# Patient Record
Sex: FEMALE | Race: WHITE | NOT HISPANIC OR LATINO | Employment: OTHER | URBAN - METROPOLITAN AREA
[De-identification: names, ages, dates, MRNs, and addresses within clinical notes are randomized per-mention and may not be internally consistent; named-entity substitution may affect disease eponyms.]

---

## 2023-12-04 ENCOUNTER — TELEPHONE (OUTPATIENT)
Dept: NEUROLOGY | Facility: CLINIC | Age: 78
End: 2023-12-04
Payer: MEDICARE

## 2023-12-04 NOTE — TELEPHONE ENCOUNTER
----- Message from Elysia Vazquez sent at 12/4/2023 12:13 PM CST -----  Type:  Sooner Apoointment Request    Caller is requesting a sooner appointment.  Caller declined first available appointment listed below.  Caller will not accept being placed on the waitlist and is requesting a message be sent to doctor.  Name of Caller: Pt  When is the first available appointment?  Symptoms: Vertigo issues  Would the patient rather a call back or a response via [facility]? Call  Best Call Back Number: [phone]  Additional Information:

## 2023-12-04 NOTE — TELEPHONE ENCOUNTER
----- Message from Elysia Vazquez sent at 12/4/2023 12:13 PM CST -----  Type:  Sooner Apoointment Request    Caller is requesting a sooner appointment.  Caller declined first available appointment listed below.  Caller will not accept being placed on the waitlist and is requesting a message be sent to doctor.  Name of Caller: Pt  When is the first available appointment?  Symptoms: Vertigo issues  Would the patient rather a call back or a response via [facility]? Call  Best Call Back Number: [phone]  Additional Information:

## 2023-12-04 NOTE — TELEPHONE ENCOUNTER
----- Message from Sai Gamino MD sent at 12/4/2023 12:41 PM CST -----    Could you find this patient a spot as soon as possible? Thank you

## 2023-12-04 NOTE — TELEPHONE ENCOUNTER
Spoke with patient and offered appt w/ Dr. Maldonado in Feb, patient stated she would like appt with Dr. Gamino, I offered N/A appt of 05/30/2024 patient declined appointment

## 2023-12-12 ENCOUNTER — OFFICE VISIT (OUTPATIENT)
Dept: NEUROLOGY | Facility: CLINIC | Age: 78
End: 2023-12-12
Payer: MEDICARE

## 2023-12-12 ENCOUNTER — LAB VISIT (OUTPATIENT)
Dept: LAB | Facility: HOSPITAL | Age: 78
End: 2023-12-12
Attending: PSYCHIATRY & NEUROLOGY
Payer: MEDICARE

## 2023-12-12 VITALS
WEIGHT: 140.63 LBS | BODY MASS INDEX: 26.55 KG/M2 | SYSTOLIC BLOOD PRESSURE: 148 MMHG | HEART RATE: 97 BPM | HEIGHT: 61 IN | DIASTOLIC BLOOD PRESSURE: 67 MMHG

## 2023-12-12 DIAGNOSIS — E03.9 ACQUIRED HYPOTHYROIDISM: ICD-10-CM

## 2023-12-12 DIAGNOSIS — E78.1 HYPERTRIGLYCERIDEMIA: ICD-10-CM

## 2023-12-12 DIAGNOSIS — R80.9 MICROALBUMINURIA DUE TO TYPE 2 DIABETES MELLITUS: ICD-10-CM

## 2023-12-12 DIAGNOSIS — N25.81 SECONDARY HYPERPARATHYROIDISM OF RENAL ORIGIN: ICD-10-CM

## 2023-12-12 DIAGNOSIS — Z79.4 CONTROLLED TYPE 2 DIABETES MELLITUS WITH STAGE 3 CHRONIC KIDNEY DISEASE, WITH LONG-TERM CURRENT USE OF INSULIN: ICD-10-CM

## 2023-12-12 DIAGNOSIS — N18.30 CONTROLLED TYPE 2 DIABETES MELLITUS WITH STAGE 3 CHRONIC KIDNEY DISEASE, WITH LONG-TERM CURRENT USE OF INSULIN: ICD-10-CM

## 2023-12-12 DIAGNOSIS — R42 DIZZINESS AND GIDDINESS: ICD-10-CM

## 2023-12-12 DIAGNOSIS — E61.1 IRON DEFICIENCY: ICD-10-CM

## 2023-12-12 DIAGNOSIS — M85.851 OSTEOPENIA OF NECKS OF BOTH FEMURS: ICD-10-CM

## 2023-12-12 DIAGNOSIS — E78.5 HYPERLIPIDEMIA ASSOCIATED WITH TYPE 2 DIABETES MELLITUS: ICD-10-CM

## 2023-12-12 DIAGNOSIS — R42 VERTIGO: Primary | ICD-10-CM

## 2023-12-12 DIAGNOSIS — G89.29 CHRONIC LEFT-SIDED LOW BACK PAIN WITH LEFT-SIDED SCIATICA: ICD-10-CM

## 2023-12-12 DIAGNOSIS — Z87.820 PERSONAL HISTORY OF TRAUMATIC BRAIN INJURY: ICD-10-CM

## 2023-12-12 DIAGNOSIS — R25.2 MUSCLE CRAMPS: ICD-10-CM

## 2023-12-12 DIAGNOSIS — E11.59 HYPERTENSION COMPLICATING DIABETES: ICD-10-CM

## 2023-12-12 DIAGNOSIS — E11.69 HYPERLIPIDEMIA ASSOCIATED WITH TYPE 2 DIABETES MELLITUS: ICD-10-CM

## 2023-12-12 DIAGNOSIS — E11.29 MICROALBUMINURIA DUE TO TYPE 2 DIABETES MELLITUS: ICD-10-CM

## 2023-12-12 DIAGNOSIS — E11.22 CKD STAGE 3 DUE TO TYPE 2 DIABETES MELLITUS: ICD-10-CM

## 2023-12-12 DIAGNOSIS — G47.09 OTHER INSOMNIA: ICD-10-CM

## 2023-12-12 DIAGNOSIS — E55.9 VITAMIN D DEFICIENCY: ICD-10-CM

## 2023-12-12 DIAGNOSIS — I15.2 HYPERTENSION COMPLICATING DIABETES: ICD-10-CM

## 2023-12-12 DIAGNOSIS — R11.15 CYCLICAL VOMITING: ICD-10-CM

## 2023-12-12 DIAGNOSIS — H91.92 HEARING LOSS OF LEFT EAR, UNSPECIFIED HEARING LOSS TYPE: ICD-10-CM

## 2023-12-12 DIAGNOSIS — R42 VERTIGO: ICD-10-CM

## 2023-12-12 DIAGNOSIS — M85.852 OSTEOPENIA OF NECKS OF BOTH FEMURS: ICD-10-CM

## 2023-12-12 DIAGNOSIS — H91.92 HEARING LOSS ASSOCIATED WITH SYNDROME OF LEFT EAR: ICD-10-CM

## 2023-12-12 DIAGNOSIS — M54.42 CHRONIC LEFT-SIDED LOW BACK PAIN WITH LEFT-SIDED SCIATICA: ICD-10-CM

## 2023-12-12 DIAGNOSIS — N18.30 CKD STAGE 3 DUE TO TYPE 2 DIABETES MELLITUS: ICD-10-CM

## 2023-12-12 DIAGNOSIS — E11.22 CONTROLLED TYPE 2 DIABETES MELLITUS WITH STAGE 3 CHRONIC KIDNEY DISEASE, WITH LONG-TERM CURRENT USE OF INSULIN: ICD-10-CM

## 2023-12-12 LAB — CK SERPL-CCNC: 220 U/L (ref 20–180)

## 2023-12-12 PROCEDURE — 1126F PR PAIN SEVERITY QUANTIFIED, NO PAIN PRESENT: ICD-10-PCS | Mod: CPTII,S$GLB,, | Performed by: PSYCHIATRY & NEUROLOGY

## 2023-12-12 PROCEDURE — 1101F PT FALLS ASSESS-DOCD LE1/YR: CPT | Mod: CPTII,S$GLB,, | Performed by: PSYCHIATRY & NEUROLOGY

## 2023-12-12 PROCEDURE — 99417 PROLNG OP E/M EACH 15 MIN: CPT | Mod: S$GLB,,, | Performed by: PSYCHIATRY & NEUROLOGY

## 2023-12-12 PROCEDURE — 1101F PR PT FALLS ASSESS DOC 0-1 FALLS W/OUT INJ PAST YR: ICD-10-PCS | Mod: CPTII,S$GLB,, | Performed by: PSYCHIATRY & NEUROLOGY

## 2023-12-12 PROCEDURE — 3288F PR FALLS RISK ASSESSMENT DOCUMENTED: ICD-10-PCS | Mod: CPTII,S$GLB,, | Performed by: PSYCHIATRY & NEUROLOGY

## 2023-12-12 PROCEDURE — 1159F PR MEDICATION LIST DOCUMENTED IN MEDICAL RECORD: ICD-10-PCS | Mod: CPTII,S$GLB,, | Performed by: PSYCHIATRY & NEUROLOGY

## 2023-12-12 PROCEDURE — 99999 PR PBB SHADOW E&M-EST. PATIENT-LVL V: CPT | Mod: PBBFAC,,, | Performed by: PSYCHIATRY & NEUROLOGY

## 2023-12-12 PROCEDURE — 36415 COLL VENOUS BLD VENIPUNCTURE: CPT | Performed by: PSYCHIATRY & NEUROLOGY

## 2023-12-12 PROCEDURE — 3077F PR MOST RECENT SYSTOLIC BLOOD PRESSURE >= 140 MM HG: ICD-10-PCS | Mod: CPTII,S$GLB,, | Performed by: PSYCHIATRY & NEUROLOGY

## 2023-12-12 PROCEDURE — 99417 PR PROLONGED SVC, OUTPT, W/WO DIRECT PT CONTACT,  EA ADDTL 15 MIN: ICD-10-PCS | Mod: S$GLB,,, | Performed by: PSYCHIATRY & NEUROLOGY

## 2023-12-12 PROCEDURE — 3288F FALL RISK ASSESSMENT DOCD: CPT | Mod: CPTII,S$GLB,, | Performed by: PSYCHIATRY & NEUROLOGY

## 2023-12-12 PROCEDURE — 1126F AMNT PAIN NOTED NONE PRSNT: CPT | Mod: CPTII,S$GLB,, | Performed by: PSYCHIATRY & NEUROLOGY

## 2023-12-12 PROCEDURE — 82550 ASSAY OF CK (CPK): CPT | Performed by: PSYCHIATRY & NEUROLOGY

## 2023-12-12 PROCEDURE — 3078F DIAST BP <80 MM HG: CPT | Mod: CPTII,S$GLB,, | Performed by: PSYCHIATRY & NEUROLOGY

## 2023-12-12 PROCEDURE — 99205 OFFICE O/P NEW HI 60 MIN: CPT | Mod: S$GLB,,, | Performed by: PSYCHIATRY & NEUROLOGY

## 2023-12-12 PROCEDURE — 99205 PR OFFICE/OUTPT VISIT, NEW, LEVL V, 60-74 MIN: ICD-10-PCS | Mod: S$GLB,,, | Performed by: PSYCHIATRY & NEUROLOGY

## 2023-12-12 PROCEDURE — 82085 ASSAY OF ALDOLASE: CPT | Performed by: PSYCHIATRY & NEUROLOGY

## 2023-12-12 PROCEDURE — 1159F MED LIST DOCD IN RCRD: CPT | Mod: CPTII,S$GLB,, | Performed by: PSYCHIATRY & NEUROLOGY

## 2023-12-12 PROCEDURE — 3078F PR MOST RECENT DIASTOLIC BLOOD PRESSURE < 80 MM HG: ICD-10-PCS | Mod: CPTII,S$GLB,, | Performed by: PSYCHIATRY & NEUROLOGY

## 2023-12-12 PROCEDURE — 99999 PR PBB SHADOW E&M-EST. PATIENT-LVL V: ICD-10-PCS | Mod: PBBFAC,,, | Performed by: PSYCHIATRY & NEUROLOGY

## 2023-12-12 PROCEDURE — 3077F SYST BP >= 140 MM HG: CPT | Mod: CPTII,S$GLB,, | Performed by: PSYCHIATRY & NEUROLOGY

## 2023-12-12 RX ORDER — ICOSAPENT ETHYL 1000 MG/1
CAPSULE ORAL
COMMUNITY
Start: 2023-08-11

## 2023-12-12 RX ORDER — LOSARTAN POTASSIUM 100 MG/1
100 TABLET ORAL DAILY
COMMUNITY

## 2023-12-12 RX ORDER — DAPAGLIFLOZIN 10 MG/1
10 TABLET, FILM COATED ORAL EVERY MORNING
COMMUNITY
Start: 2023-10-20

## 2023-12-12 RX ORDER — LANCETS 33 GAUGE
100 EACH MISCELLANEOUS
COMMUNITY
Start: 2023-05-25

## 2023-12-12 RX ORDER — ERGOCALCIFEROL 1.25 MG/1
50000 CAPSULE ORAL
COMMUNITY
Start: 2023-11-20

## 2023-12-12 RX ORDER — DEXTROSE 4 G
TABLET,CHEWABLE ORAL
COMMUNITY
Start: 2023-05-25

## 2023-12-12 RX ORDER — LEVOTHYROXINE SODIUM 50 UG/1
50 TABLET ORAL
COMMUNITY

## 2023-12-12 RX ORDER — INSULIN GLARGINE AND LIXISENATIDE 100; 33 U/ML; UG/ML
100 INJECTION, SOLUTION SUBCUTANEOUS NIGHTLY
COMMUNITY

## 2023-12-12 RX ORDER — AMITRIPTYLINE HYDROCHLORIDE 25 MG/1
25 TABLET, FILM COATED ORAL NIGHTLY PRN
Qty: 90 TABLET | Refills: 3 | Status: SHIPPED | OUTPATIENT
Start: 2023-12-12 | End: 2024-02-07 | Stop reason: SDUPTHER

## 2023-12-12 RX ORDER — CALCIUM CITRATE/VITAMIN D3 200MG-6.25
TABLET ORAL
COMMUNITY
Start: 2023-05-25

## 2023-12-12 RX ORDER — ONDANSETRON 4 MG/1
4 TABLET, ORALLY DISINTEGRATING ORAL EVERY 6 HOURS PRN
COMMUNITY
Start: 2023-12-02 | End: 2024-02-07 | Stop reason: SDUPTHER

## 2023-12-12 RX ORDER — COLESEVELAM 180 1/1
625 TABLET ORAL EVERY MORNING
COMMUNITY
Start: 2023-08-30

## 2023-12-12 RX ORDER — ATORVASTATIN CALCIUM 40 MG/1
40 TABLET, FILM COATED ORAL DAILY
COMMUNITY

## 2023-12-12 RX ORDER — LANCING DEVICE
EACH MISCELLANEOUS
COMMUNITY
Start: 2023-05-25

## 2023-12-15 LAB — ALDOLASE SERPL-CCNC: 7.7 U/L (ref 1.2–7.6)

## 2023-12-15 NOTE — PROGRESS NOTES
12-    CK and Aldolase are very mildly elevated which could be related to to the statin. Hopefully the Co-Q 10 will help in addition to increasing hydration.

## 2023-12-19 ENCOUNTER — OFFICE VISIT (OUTPATIENT)
Dept: OTOLARYNGOLOGY | Facility: CLINIC | Age: 78
End: 2023-12-19
Payer: MEDICARE

## 2023-12-19 DIAGNOSIS — R42 VERTIGO: ICD-10-CM

## 2023-12-19 PROCEDURE — 1159F PR MEDICATION LIST DOCUMENTED IN MEDICAL RECORD: ICD-10-PCS | Mod: CPTII,S$GLB,, | Performed by: PHYSICIAN ASSISTANT

## 2023-12-19 PROCEDURE — 3288F PR FALLS RISK ASSESSMENT DOCUMENTED: ICD-10-PCS | Mod: CPTII,S$GLB,, | Performed by: PHYSICIAN ASSISTANT

## 2023-12-19 PROCEDURE — 1101F PR PT FALLS ASSESS DOC 0-1 FALLS W/OUT INJ PAST YR: ICD-10-PCS | Mod: CPTII,S$GLB,, | Performed by: PHYSICIAN ASSISTANT

## 2023-12-19 PROCEDURE — 99999 PR PBB SHADOW E&M-EST. PATIENT-LVL III: ICD-10-PCS | Mod: PBBFAC,,, | Performed by: PHYSICIAN ASSISTANT

## 2023-12-19 PROCEDURE — 1159F MED LIST DOCD IN RCRD: CPT | Mod: CPTII,S$GLB,, | Performed by: PHYSICIAN ASSISTANT

## 2023-12-19 PROCEDURE — 3288F FALL RISK ASSESSMENT DOCD: CPT | Mod: CPTII,S$GLB,, | Performed by: PHYSICIAN ASSISTANT

## 2023-12-19 PROCEDURE — 99204 PR OFFICE/OUTPT VISIT, NEW, LEVL IV, 45-59 MIN: ICD-10-PCS | Mod: S$GLB,,, | Performed by: PHYSICIAN ASSISTANT

## 2023-12-19 PROCEDURE — 1101F PT FALLS ASSESS-DOCD LE1/YR: CPT | Mod: CPTII,S$GLB,, | Performed by: PHYSICIAN ASSISTANT

## 2023-12-19 PROCEDURE — 99999 PR PBB SHADOW E&M-EST. PATIENT-LVL III: CPT | Mod: PBBFAC,,, | Performed by: PHYSICIAN ASSISTANT

## 2023-12-19 PROCEDURE — 99204 OFFICE O/P NEW MOD 45 MIN: CPT | Mod: S$GLB,,, | Performed by: PHYSICIAN ASSISTANT
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
## 2023-12-28 ENCOUNTER — CLINICAL SUPPORT (OUTPATIENT)
Dept: REHABILITATION | Facility: HOSPITAL | Age: 78
End: 2023-12-28
Attending: PSYCHIATRY & NEUROLOGY
Payer: MEDICARE

## 2023-12-28 DIAGNOSIS — R42 VERTIGO: Primary | ICD-10-CM

## 2023-12-28 PROCEDURE — 97110 THERAPEUTIC EXERCISES: CPT | Performed by: PHYSICAL THERAPIST

## 2023-12-28 PROCEDURE — 97162 PT EVAL MOD COMPLEX 30 MIN: CPT | Performed by: PHYSICAL THERAPIST
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
## 2024-01-08 ENCOUNTER — HOSPITAL ENCOUNTER (OUTPATIENT)
Dept: PULMONOLOGY | Facility: HOSPITAL | Age: 79
Discharge: HOME | End: 2024-01-08
Attending: PSYCHIATRY & NEUROLOGY
Payer: MEDICARE

## 2024-01-08 ENCOUNTER — TELEPHONE (OUTPATIENT)
Dept: NEUROLOGY | Facility: CLINIC | Age: 79
End: 2024-01-08

## 2024-01-08 DIAGNOSIS — G89.29 CHRONIC LEFT-SIDED LOW BACK PAIN WITH LEFT-SIDED SCIATICA: ICD-10-CM

## 2024-01-08 DIAGNOSIS — E61.1 IRON DEFICIENCY: ICD-10-CM

## 2024-01-08 DIAGNOSIS — E78.1 HYPERTRIGLYCERIDEMIA: ICD-10-CM

## 2024-01-08 DIAGNOSIS — N18.30 CONTROLLED TYPE 2 DIABETES MELLITUS WITH STAGE 3 CHRONIC KIDNEY DISEASE, WITH LONG-TERM CURRENT USE OF INSULIN: ICD-10-CM

## 2024-01-08 DIAGNOSIS — M85.852 OSTEOPENIA OF NECKS OF BOTH FEMURS: ICD-10-CM

## 2024-01-08 DIAGNOSIS — E55.9 VITAMIN D DEFICIENCY: ICD-10-CM

## 2024-01-08 DIAGNOSIS — R42 DIZZINESS AND GIDDINESS: ICD-10-CM

## 2024-01-08 DIAGNOSIS — E11.29 MICROALBUMINURIA DUE TO TYPE 2 DIABETES MELLITUS: ICD-10-CM

## 2024-01-08 DIAGNOSIS — Z87.820 PERSONAL HISTORY OF TRAUMATIC BRAIN INJURY: ICD-10-CM

## 2024-01-08 DIAGNOSIS — R25.2 MUSCLE CRAMPS: ICD-10-CM

## 2024-01-08 DIAGNOSIS — R42 VERTIGO: ICD-10-CM

## 2024-01-08 DIAGNOSIS — N18.30 CKD STAGE 3 DUE TO TYPE 2 DIABETES MELLITUS: ICD-10-CM

## 2024-01-08 DIAGNOSIS — I15.2 HYPERTENSION COMPLICATING DIABETES: ICD-10-CM

## 2024-01-08 DIAGNOSIS — E03.9 ACQUIRED HYPOTHYROIDISM: ICD-10-CM

## 2024-01-08 DIAGNOSIS — H91.92 HEARING LOSS ASSOCIATED WITH SYNDROME OF LEFT EAR: ICD-10-CM

## 2024-01-08 DIAGNOSIS — E11.59 HYPERTENSION COMPLICATING DIABETES: ICD-10-CM

## 2024-01-08 DIAGNOSIS — N25.81 SECONDARY HYPERPARATHYROIDISM OF RENAL ORIGIN: ICD-10-CM

## 2024-01-08 DIAGNOSIS — E11.22 CONTROLLED TYPE 2 DIABETES MELLITUS WITH STAGE 3 CHRONIC KIDNEY DISEASE, WITH LONG-TERM CURRENT USE OF INSULIN: ICD-10-CM

## 2024-01-08 DIAGNOSIS — E11.22 CKD STAGE 3 DUE TO TYPE 2 DIABETES MELLITUS: ICD-10-CM

## 2024-01-08 DIAGNOSIS — R80.9 MICROALBUMINURIA DUE TO TYPE 2 DIABETES MELLITUS: ICD-10-CM

## 2024-01-08 DIAGNOSIS — E11.69 HYPERLIPIDEMIA ASSOCIATED WITH TYPE 2 DIABETES MELLITUS: ICD-10-CM

## 2024-01-08 DIAGNOSIS — E78.5 HYPERLIPIDEMIA ASSOCIATED WITH TYPE 2 DIABETES MELLITUS: ICD-10-CM

## 2024-01-08 DIAGNOSIS — M85.851 OSTEOPENIA OF NECKS OF BOTH FEMURS: ICD-10-CM

## 2024-01-08 DIAGNOSIS — Z79.4 CONTROLLED TYPE 2 DIABETES MELLITUS WITH STAGE 3 CHRONIC KIDNEY DISEASE, WITH LONG-TERM CURRENT USE OF INSULIN: ICD-10-CM

## 2024-01-08 DIAGNOSIS — M54.42 CHRONIC LEFT-SIDED LOW BACK PAIN WITH LEFT-SIDED SCIATICA: ICD-10-CM

## 2024-01-08 PROCEDURE — 95816 EEG AWAKE AND DROWSY: CPT

## 2024-01-08 PROCEDURE — 95819 EEG AWAKE AND ASLEEP: CPT | Mod: 26,,, | Performed by: PSYCHIATRY & NEUROLOGY

## 2024-01-08 NOTE — PROCEDURES
DATE EEG PERFORMED:  2024.      DATE EEG INTERPRETED: 2024.                     DURATION OF EE MINUTES         LEVEL OF CONSCIOUSENESS    Awake and Sleep.         EEG BACKGROUND    The posterior dominant basic rhythm reaches 8-9 Hz, symmetric, reactive, well-modulated and well-sustained.         EEG CLASSIFICATION    Normal        IMPRESSION      The EEG is normal in the awake and sleep states.       There are no epileptiform discharges or lateralizing signs. No typical events were recorded. There is no electrographic evidence of seizure.There is no electrographic evidence of status epilepticus.         PLEASE NOTE THAT A NON-EPILEPTIFORM EEG DOES NOT RULE OUT EPILEPSY.        JS BRUCE MD, FAAN    Diplomate, American Board of Psychiatry and Neurology    Diplomate, American Board of Clinical Neurophysiology

## 2024-01-09 ENCOUNTER — CLINICAL SUPPORT (OUTPATIENT)
Dept: REHABILITATION | Facility: HOSPITAL | Age: 79
End: 2024-01-09
Payer: MEDICARE

## 2024-01-09 DIAGNOSIS — R42 VERTIGO: Primary | ICD-10-CM

## 2024-01-09 PROCEDURE — 97110 THERAPEUTIC EXERCISES: CPT

## 2024-01-09 NOTE — PROGRESS NOTES
[facility]   Physical Therapy Treatment Note        Name: Juany Ramírez  Clinic Number: [id number]    Therapy Diagnosis:   Encounter Diagnosis   Name Primary?    Vertigo Yes     Physician: Sai Gamino MD    Visit Date: 1/9/2024    Physician Orders: PT Eval and Treat  Medical Diagnosis from Referral: Vertigo  Evaluation Date: 12/28/2023  Authorization Period Expiration: 12/11/2024  Plan of Care Expiration: 3/27/2024  Visit # / Visits authorized: 1/1  FOTO: 1/3 (last performed on 12/28/2023)     Progress Note Due on 1/27/2024     Precautions: Standard  PTA Visit #: 0/5     Time In: 1345  Time Out: 1445  Total Billable Time: 60 minutes (Billing reflects 1 on 1 treatment time spent with patient)    Subjective     Patient reports: that she is still has a bit of apprehension with movement due to fear of exacerbating vertigo type symptoms.    He/She was compliant with home exercise program.  Response to previous treatment: no change  Functional change: no change    Pain: 0/10     Location: No pain, previous problem was with vertigo symptoms.  However, vertigo symptoms are 0/10 at rest    Objective      Objective Measures updated at progress report or POC update only unless otherwise noted.       Treatment     Juany received the treatments listed below:     THERAPEUTIC EXERCISES to develop strength, endurance, ROM, flexibility, posture, and core stabilization for (60) minutes including:    Intervention Performed Today    Nu-Step x X 6 minutes   Sitting Eye/Head Movements   X  X    X  X      X  X    X  x Smooth Pursuits:  Vertical Eye Movements - 50 Hz x 1m  Horizontal Eye Movements - 50 Hz x 1m  VOR  Vertical head - 50 Hz x 1m  Horizontal Head - 50 Hz x 1m    Attempted more saccadic:  Vertical Eye - 80 Hz x 1m  Horizontal Eye - 80 Hz x 1m  VOR - Attempted 80 Hz and unable  Vertical - 60 Hz x 1m  Horizontal - 60 Hz x 1m   Sit to stand  x 2x10   Standing - non tandem Eye/Head Movements    X  X    X  X      X  X    X  x Smooth Pursuits:  Vertical Eye Movements - 50 Hz x 1m  Horizontal Eye Movements - 50 Hz x 1m  VOR  Vertical head - 50 Hz x 1m  Horizontal Head - 50 Hz x 1m    Attempted more saccadic:  Vertical Eye - 80 Hz x 1m  Horizontal Eye - 80 Hz x 1m  VOR - Attempted 80 Hz and unable  Vertical - 60 Hz x 1m  Horizontal - 60 Hz x 1m   Tandem stance X    X  x Hand Touches - 2x10  Smooth Pursuit:  Vertical - 50 Hz x 1m  Horizontal - 50 Hz x 1m                      Plan for Next Visit:           Patient Education and Home Exercises       Home Exercises Provided and Patient Education Provided     Education provided: (5) minutes    Home exercises    Written Home Exercises Provided: yes.  Exercises were reviewed and Juany was able to demonstrate them prior to the end of the session.  Juany demonstrated good  understanding of the education provided. See EMR under Patient Instructions for exercises provided during therapy sessions.    Assessment     Patient does still report of apprehension with vertigo symptoms.  She was able to tolerate smooth pursuits.  However, there was significant difficulty with greater velocities of movement with both eye movements and head movements.  Will continue to progress into more movement activities at higher frequencies.  Also encouraged the patient to return to [employer] classes with her  and perform th e activities that she can tolerate.    Juany is progressing well towards her goals.   Patient prognosis is Good.     Patient will continue to benefit from skilled outpatient physical therapy to address the deficits listed in the problem list box on initial evaluation, provide pt/family education and to maximize patient's level of independence in the home and community environment.     Patient's spiritual, cultural and educational needs considered and pt agreeable to plan of care and goals.     Anticipated Barriers for therapy: co-morbidities,  chronicity of condition, and lack of understanding of condition     GOALS:     Short Term Goals:  6 weeks Progress   Dizziness: Patient will report decreased dizziness, and recent s/s trend is improving in order to progress towards LTG's. PC   Function: Patient will demonstrate improved function as indicated by a score of greater than or equal to 52 out of 100 on FOTO. PC   Gait: Patient will demonstrate improved gait mechanics in order to improve functional mobility, improve quality of life, and decrease risk of further injury or fall.  PC   HEP: Patient will demonstrate independence with HEP in order to progress toward functional independence. PC   Improve postural awareness.  PC         Long Term Goals:  12 weeks Progress   Dizziness:  Patient will return to normal ADL, recreational, and work related activities with less dizziness and limitation.  PC   Function: Patient will demonstrate improved function as indicated by a score of greater than or equal to 55 out of 100 on FOTO. PC   Gait: Patient will demonstrate normalized gait mechanics with minimal compensation in order to return to PLOF. PC   Decrease saccades as well as positional and VOR related s/s in order for patient to be able to switch positions or look quickly to different directions without onset of dizziness.  PC         Plan     Continue Plan of Care (POC) and progress per patient tolerance. See treatment section for details on planned progressions next session.      Jose Gould, PT

## 2024-01-12 ENCOUNTER — CLINICAL SUPPORT (OUTPATIENT)
Dept: AUDIOLOGY | Facility: CLINIC | Age: 79
End: 2024-01-12
Payer: MEDICARE

## 2024-01-12 DIAGNOSIS — H91.92 HEARING LOSS OF LEFT EAR, UNSPECIFIED HEARING LOSS TYPE: ICD-10-CM

## 2024-01-12 DIAGNOSIS — H81.11 BPPV (BENIGN PAROXYSMAL POSITIONAL VERTIGO), RIGHT: Primary | ICD-10-CM

## 2024-01-12 PROCEDURE — 99999 PR PBB SHADOW E&M-EST. PATIENT-LVL I: CPT | Mod: PBBFAC,,,

## 2024-01-12 PROCEDURE — 92540 BASIC VESTIBULAR EVALUATION: CPT | Mod: S$GLB,,,
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
## 2024-01-17 ENCOUNTER — CLINICAL SUPPORT (OUTPATIENT)
Dept: REHABILITATION | Facility: HOSPITAL | Age: 79
End: 2024-01-17
Payer: MEDICARE

## 2024-01-17 DIAGNOSIS — R42 VERTIGO: Primary | ICD-10-CM

## 2024-01-17 PROCEDURE — 97112 NEUROMUSCULAR REEDUCATION: CPT | Performed by: PHYSICAL THERAPIST

## 2024-01-17 NOTE — PROGRESS NOTES
[facility]   Physical Therapy Treatment Note        Name: Juany Ramírez  Clinic Number: [id number]    Therapy Diagnosis:   Encounter Diagnosis   Name Primary?    Vertigo Yes     Physician: Sai Gamino MD    Visit Date: 1/17/2024    Physician Orders: PT Eval and Treat  Medical Diagnosis from Referral: Vertigo  Evaluation Date: 12/28/2023  Authorization Period Expiration: 12/31/2024  Plan of Care Expiration: 3/27/2024  Visit # / Visits authorized: 2/20 (+eval)  FOTO: 1/3 (last performed on 12/28/2023)     Progress Note Due on 1/27/2024     Precautions: Standard    PTA Visit #: 0/5     Time In: 1507  Time Out: 1602  Total Billable Time: 53 minutes (Billing reflects 1 on 1 treatment time spent with patient)    Subjective     Patient reports: that she felt okay following last visit, but then she went to get her VNG and that really flared up her s/s again. She states that they did a maneuver to realign her crystals, but it did not seem to help. Patient reports that she then had a pretty bad episode and day on Sunday. She states that she had intense dizziness and spinning sensation. She immediately took her nausea medication, so she did not throw up, but then she went to bed and slept for hours. She has been feeling pretty off since then, weak and no energy.     He/She was compliant with home exercise program.  Response to previous treatment: no change  Functional change: no change    Pain: 0/10     Location: No pain, previous problem was with vertigo symptoms.  However, vertigo symptoms are 0/10 at rest    Objective      Objective Measures updated at progress report or POC update only unless otherwise noted.       Treatment     Juany received the treatments listed below:         NEUROMUSCULAR RE-EDUCATION ACTIVITIES to improve Balance, Coordination, Kinesthetic, Sense, Proprioception, and Posture for (53) minutes.  The following were included:    Intervention Performed Today    Nu-Step  "x X 6 minutes   Sitting Eye/Head Movements   X  X    X  X            X  x Smooth Pursuits:  Vertical Eye Movements - 50 Hz x 1m  Horizontal Eye Movements - 50 Hz x 1m  VOR  Vertical head - 50 Hz x 1m  Horizontal Head - 50 Hz x 1m    Attempted more saccadic:  Vertical Eye - 80 Hz x 1m  Horizontal Eye - 80 Hz x 1m  VOR - Attempted 80 Hz and unable  Vertical - 60 Hz x 1m  Horizontal - 60 Hz x 1m   Sit to stand  x 2x10   Standing - non tandem Eye/Head Movements   time Smooth Pursuits:  Vertical Eye Movements - 50 Hz x 1m  Horizontal Eye Movements - 50 Hz x 1m  VOR  Vertical head - 50 Hz x 1m  Horizontal Head - 50 Hz x 1m    Attempted more saccadic:  Vertical Eye - 80 Hz x 1m  Horizontal Eye - 80 Hz x 1m  VOR - Attempted 80 Hz and unable  Vertical - 60 Hz x 1m  Horizontal - 60 Hz x 1m   Tandem stance X     Hand Touches - 2x10  Smooth Pursuit:  Vertical - 50 Hz x 1m  Horizontal - 50 Hz x 1m     Moira Hallpike x Negative Bilaterally today    Patient Education x Discussed "crystals" in inner ear, what they are, how they can go into different canals and cause dizziness, etc. Showed patient picture of inner ear          Plan for Next Visit:         Patient Education and Home Exercises       Home Exercises Provided and Patient Education Provided     Education provided: (included in treatment section) minutes    Home exercises    Written Home Exercises Provided: yes.  Exercises were reviewed and Juany was able to demonstrate them prior to the end of the session.  Juany demonstrated good  understanding of the education provided. See EMR under Patient Instructions for exercises provided during therapy sessions.    Assessment     Patient does still report of apprehension with vertigo symptoms, especially since she is still feeling a little more off following Epley Maneuver last week with audiology. She was able to tolerate sitting VOM's well today, but held standing exercises due to time re-assessing for BPPV. Negative Moira " Hallpike bilaterally today. Discussed pausing for a few seconds when standing before taking off to make sure she has her balance, and she expressed understanding. Slow, unsure gait noted today due to symptoms. She did well with standing balance tandem stance activity.       Juany is progressing well towards her goals.   Patient prognosis is Good.     Patient will continue to benefit from skilled outpatient physical therapy to address the deficits listed in the problem list box on initial evaluation, provide pt/family education and to maximize patient's level of independence in the home and community environment.     Patient's spiritual, cultural and educational needs considered and pt agreeable to plan of care and goals.     Anticipated Barriers for therapy: co-morbidities, chronicity of condition, and lack of understanding of condition     GOALS:     Short Term Goals:  6 weeks Progress   Dizziness: Patient will report decreased dizziness, and recent s/s trend is improving in order to progress towards LTG's. PC   Function: Patient will demonstrate improved function as indicated by a score of greater than or equal to 52 out of 100 on FOTO. PC   Gait: Patient will demonstrate improved gait mechanics in order to improve functional mobility, improve quality of life, and decrease risk of further injury or fall.  PC   HEP: Patient will demonstrate independence with HEP in order to progress toward functional independence. PC   Improve postural awareness.  PC         Long Term Goals:  12 weeks Progress   Dizziness:  Patient will return to normal ADL, recreational, and work related activities with less dizziness and limitation.  PC   Function: Patient will demonstrate improved function as indicated by a score of greater than or equal to 55 out of 100 on FOTO. PC   Gait: Patient will demonstrate normalized gait mechanics with minimal compensation in order to return to PLOF. PC   Decrease saccades as well as positional and  VOR related s/s in order for patient to be able to switch positions or look quickly to different directions without onset of dizziness.  PC         Plan     Continue Plan of Care (POC) and progress per patient tolerance. See treatment section for details on planned progressions next session.      Elizabeth Centeno, PT

## 2024-01-18 ENCOUNTER — HOSPITAL ENCOUNTER (OUTPATIENT)
Dept: RADIOLOGY | Facility: HOSPITAL | Age: 79
Discharge: HOME | End: 2024-01-18
Attending: PSYCHIATRY & NEUROLOGY
Payer: MEDICARE

## 2024-01-18 DIAGNOSIS — E78.1 HYPERTRIGLYCERIDEMIA: ICD-10-CM

## 2024-01-18 DIAGNOSIS — R42 DIZZINESS AND GIDDINESS: ICD-10-CM

## 2024-01-18 DIAGNOSIS — N18.30 CKD STAGE 3 DUE TO TYPE 2 DIABETES MELLITUS: ICD-10-CM

## 2024-01-18 DIAGNOSIS — I15.2 HYPERTENSION COMPLICATING DIABETES: ICD-10-CM

## 2024-01-18 DIAGNOSIS — E61.1 IRON DEFICIENCY: ICD-10-CM

## 2024-01-18 DIAGNOSIS — M85.852 OSTEOPENIA OF NECKS OF BOTH FEMURS: ICD-10-CM

## 2024-01-18 DIAGNOSIS — Z87.820 PERSONAL HISTORY OF TRAUMATIC BRAIN INJURY: ICD-10-CM

## 2024-01-18 DIAGNOSIS — G89.29 CHRONIC LEFT-SIDED LOW BACK PAIN WITH LEFT-SIDED SCIATICA: ICD-10-CM

## 2024-01-18 DIAGNOSIS — M85.851 OSTEOPENIA OF NECKS OF BOTH FEMURS: ICD-10-CM

## 2024-01-18 DIAGNOSIS — E55.9 VITAMIN D DEFICIENCY: ICD-10-CM

## 2024-01-18 DIAGNOSIS — E11.29 MICROALBUMINURIA DUE TO TYPE 2 DIABETES MELLITUS: ICD-10-CM

## 2024-01-18 DIAGNOSIS — E03.9 ACQUIRED HYPOTHYROIDISM: ICD-10-CM

## 2024-01-18 DIAGNOSIS — E78.5 HYPERLIPIDEMIA ASSOCIATED WITH TYPE 2 DIABETES MELLITUS: ICD-10-CM

## 2024-01-18 DIAGNOSIS — M54.42 CHRONIC LEFT-SIDED LOW BACK PAIN WITH LEFT-SIDED SCIATICA: ICD-10-CM

## 2024-01-18 DIAGNOSIS — R25.2 MUSCLE CRAMPS: ICD-10-CM

## 2024-01-18 DIAGNOSIS — R42 VERTIGO: ICD-10-CM

## 2024-01-18 DIAGNOSIS — Z79.4 CONTROLLED TYPE 2 DIABETES MELLITUS WITH STAGE 3 CHRONIC KIDNEY DISEASE, WITH LONG-TERM CURRENT USE OF INSULIN: ICD-10-CM

## 2024-01-18 DIAGNOSIS — N18.30 CONTROLLED TYPE 2 DIABETES MELLITUS WITH STAGE 3 CHRONIC KIDNEY DISEASE, WITH LONG-TERM CURRENT USE OF INSULIN: ICD-10-CM

## 2024-01-18 DIAGNOSIS — H91.92 HEARING LOSS ASSOCIATED WITH SYNDROME OF LEFT EAR: ICD-10-CM

## 2024-01-18 DIAGNOSIS — E11.22 CONTROLLED TYPE 2 DIABETES MELLITUS WITH STAGE 3 CHRONIC KIDNEY DISEASE, WITH LONG-TERM CURRENT USE OF INSULIN: ICD-10-CM

## 2024-01-18 DIAGNOSIS — E11.69 HYPERLIPIDEMIA ASSOCIATED WITH TYPE 2 DIABETES MELLITUS: ICD-10-CM

## 2024-01-18 DIAGNOSIS — E11.59 HYPERTENSION COMPLICATING DIABETES: ICD-10-CM

## 2024-01-18 DIAGNOSIS — E11.22 CKD STAGE 3 DUE TO TYPE 2 DIABETES MELLITUS: ICD-10-CM

## 2024-01-18 DIAGNOSIS — R80.9 MICROALBUMINURIA DUE TO TYPE 2 DIABETES MELLITUS: ICD-10-CM

## 2024-01-18 DIAGNOSIS — N25.81 SECONDARY HYPERPARATHYROIDISM OF RENAL ORIGIN: ICD-10-CM

## 2024-01-18 PROCEDURE — 70544 MR ANGIOGRAPHY HEAD W/O DYE: CPT | Mod: TC

## 2024-01-18 PROCEDURE — 70547 MR ANGIOGRAPHY NECK W/O DYE: CPT | Mod: 26,,, | Performed by: RADIOLOGY

## 2024-01-18 PROCEDURE — 70544 MR ANGIOGRAPHY HEAD W/O DYE: CPT | Mod: 26,59,, | Performed by: RADIOLOGY

## 2024-01-18 PROCEDURE — 70551 MRI BRAIN STEM W/O DYE: CPT | Mod: TC

## 2024-01-18 PROCEDURE — 70547 MR ANGIOGRAPHY NECK W/O DYE: CPT | Mod: TC

## 2024-01-18 PROCEDURE — 70551 MRI BRAIN STEM W/O DYE: CPT | Mod: 26,,, | Performed by: RADIOLOGY

## 2024-01-29 ENCOUNTER — CLINICAL SUPPORT (OUTPATIENT)
Dept: REHABILITATION | Facility: HOSPITAL | Age: 79
End: 2024-01-29
Payer: MEDICARE

## 2024-01-29 DIAGNOSIS — R42 VERTIGO: Primary | ICD-10-CM

## 2024-01-29 PROCEDURE — 97112 NEUROMUSCULAR REEDUCATION: CPT

## 2024-01-29 PROCEDURE — 97140 MANUAL THERAPY 1/> REGIONS: CPT

## 2024-01-29 NOTE — PROGRESS NOTES
[facility]   Physical Therapy Treatment Note        Name: Juany Ramírez  Clinic Number: [id number]    Therapy Diagnosis:   Encounter Diagnosis   Name Primary?    Vertigo Yes     Physician: Sai Gamino MD    Visit Date: 1/29/2024    Physician Orders: PT Eval and Treat  Medical Diagnosis from Referral: Vertigo  Evaluation Date: 12/28/2023  Authorization Period Expiration: 12/31/2024  Plan of Care Expiration: 3/27/2024  Visit # / Visits authorized: 2/20 (+eval)  FOTO: 1/3 (last performed on 12/28/2023)     Progress Note Due on 2/29/2024     Precautions: Standard    PTA Visit #: 0/5     Time In: 0915  Time Out: 1015  Total Billable Time: 60 minutes (Billing reflects 1 on 1 treatment time spent with patient)    Subjective     Patient reports: that she has had symptoms of vertigo over the last week.  She has stopped many activities, has not performed home exercises due to fear of worsening symptoms, and reports that she is feeling a bit depressed.    He/She was compliant with home exercise program.  Response to previous treatment: no change  Functional change: no change    Pain: 0/10     Location: No pain, previous problem was with vertigo symptoms.  However, vertigo symptoms are 0/10 at rest    Objective      Objective Measures updated at progress report or POC update only unless otherwise noted.         Cervical Spine ROM: FB 90%, BB 50% stiffness/apprehension of symptoms, L SB 50% mild stiffness/apprehension of symptoms, R SB 50%, R Rot 75%, L Rot 66% mild dizziness and report of stiffness    Eye movements: right horizontal eye movements caused a bit of nystagmus    Treatment     Juany received the treatments listed below:         NEUROMUSCULAR RE-EDUCATION ACTIVITIES to improve Balance, Coordination, Kinesthetic, Sense, Proprioception, and Posture for (30) minutes.  The following were included:    Intervention Performed Today    Nu-Step x X 6 minutes   Bicycle x X 8 minutes  "  Sitting Eye/Head Movements   X  X    X  X            X  x Smooth Pursuits:  Vertical Eye Movements - 50 Hz x 1m  Horizontal Eye Movements - 50 Hz x 1m  VOR  Vertical head - 50 Hz x 1m  Horizontal Head - 50 Hz x 1m    Attempted more saccadic:  Vertical Eye - 80 Hz x 1m  Horizontal Eye - 80 Hz x 1m  VOR - Attempted 80 Hz and unable  Vertical - 60 Hz x 1m  Horizontal - 60 Hz x 1m   Sit to stand  x 2x10   Standing - non tandem Eye/Head Movements    Smooth Pursuits:  Vertical Eye Movements - 50 Hz x 1m  Horizontal Eye Movements - 50 Hz x 1m  VOR  Vertical head - 50 Hz x 1m  Horizontal Head - 50 Hz x 1m    Attempted more saccadic:  Vertical Eye - 80 Hz x 1m  Horizontal Eye - 80 Hz x 1m  VOR - Attempted 80 Hz and unable  Vertical - 60 Hz x 1m  Horizontal - 60 Hz x 1m   Tandem stance X     Hand Touches - 2x10  Smooth Pursuit:  Vertical - 50 Hz x 1m  Horizontal - 50 Hz x 1m     Moira Hallpike  Negative Bilaterally today    Patient Education  Discussed "crystals" in inner ear, what they are, how they can go into different canals and cause dizziness, etc. Showed patient picture of inner ear          Plan for Next Visit:       MANUAL THERAPY TECHNIQUES were applied for (30) minutes, including:    Manual Intervention Performed Today    Soft Tissue Mobilization x Cervical paraspinal and UT   Joint Mobilizations x Light midcervical, upper cervicalgrads 1-4 rotational and shear mobs   Mobilization with movement          Functional Dry Needling        Plan for Next Visit:         Patient Education and Home Exercises       Home Exercises Provided and Patient Education Provided     Education provided: (included in treatment section) minutes    Home exercises    Written Home Exercises Provided: yes.  Exercises were reviewed and Juany was able to demonstrate them prior to the end of the session.  Juany demonstrated good  understanding of the education provided. See EMR under Patient Instructions for exercises provided during " therapy sessions.    Assessment     Patient with significant stiffness and guarding at the cervical spine.  This appears due to apprehension of dizziness but also due to mild pain and stiffness.  Lack of motion at the cervical spine can prevent some kinesioceptive and proprioceptive feedback.  Worked on gentle mobilization to decrease apprehension. No nystagmus noted with cervical spine movement.  There was some nystagmus with eye horizontal movement toward the right.  Will continue to progress into cervical movement along with eye activities to tolerance.      Juany is progressing well towards her goals.   Patient prognosis is Good.     Patient will continue to benefit from skilled outpatient physical therapy to address the deficits listed in the problem list box on initial evaluation, provide pt/family education and to maximize patient's level of independence in the home and community environment.     Patient's spiritual, cultural and educational needs considered and pt agreeable to plan of care and goals.     Anticipated Barriers for therapy: co-morbidities, chronicity of condition, and lack of understanding of condition     GOALS:     Short Term Goals:  6 weeks Progress   Dizziness: Patient will report decreased dizziness, and recent s/s trend is improving in order to progress towards LTG's. PC   Function: Patient will demonstrate improved function as indicated by a score of greater than or equal to 52 out of 100 on FOTO. PC   Gait: Patient will demonstrate improved gait mechanics in order to improve functional mobility, improve quality of life, and decrease risk of further injury or fall.  PC   HEP: Patient will demonstrate independence with HEP in order to progress toward functional independence. PC   Improve postural awareness.  PC         Long Term Goals:  12 weeks Progress   Dizziness:  Patient will return to normal ADL, recreational, and work related activities with less dizziness and limitation.  PC    Function: Patient will demonstrate improved function as indicated by a score of greater than or equal to 55 out of 100 on FOTO. PC   Gait: Patient will demonstrate normalized gait mechanics with minimal compensation in order to return to PLOF. PC   Decrease saccades as well as positional and VOR related s/s in order for patient to be able to switch positions or look quickly to different directions without onset of dizziness.  PC         Plan     Continue Plan of Care (POC) and progress per patient tolerance. See treatment section for details on planned progressions next session.      Jose Gould, PT

## 2024-01-31 NOTE — PLAN OF CARE
[facility]   Physical Therapy Treatment Note        Name: Juany Ramírez  Clinic Number: [id number]    Therapy Diagnosis:   Encounter Diagnosis   Name Primary?    Vertigo Yes     Physician: Sai Gamino MD    Visit Date: 1/29/2024    Physician Orders: PT Eval and Treat  Medical Diagnosis from Referral: Vertigo  Evaluation Date: 12/28/2023  Authorization Period Expiration: 12/31/2024  Plan of Care Expiration: 3/27/2024  Visit # / Visits authorized: 2/20 (+eval)  FOTO: 1/3 (last performed on 12/28/2023)     Progress Note Due on 2/29/2024     Precautions: Standard    PTA Visit #: 0/5     Time In: 0915  Time Out: 1015  Total Billable Time: 60 minutes (Billing reflects 1 on 1 treatment time spent with patient)    Subjective     Patient reports: that she has had symptoms of vertigo over the last week.  She has stopped many activities, has not performed home exercises due to fear of worsening symptoms, and reports that she is feeling a bit depressed.    He/She was compliant with home exercise program.  Response to previous treatment: no change  Functional change: no change    Pain: 0/10     Location: No pain, previous problem was with vertigo symptoms.  However, vertigo symptoms are 0/10 at rest    Objective      Objective Measures updated at progress report or POC update only unless otherwise noted.         Cervical Spine ROM: FB 90%, BB 50% stiffness/apprehension of symptoms, L SB 50% mild stiffness/apprehension of symptoms, R SB 50%, R Rot 75%, L Rot 66% mild dizziness and report of stiffness    Eye movements: right horizontal eye movements caused a bit of nystagmus    Treatment     Juany received the treatments listed below:         NEUROMUSCULAR RE-EDUCATION ACTIVITIES to improve Balance, Coordination, Kinesthetic, Sense, Proprioception, and Posture for (30) minutes.  The following were included:    Intervention Performed Today    Nu-Step x X 6 minutes   Bicycle x X 8 minutes  "  Sitting Eye/Head Movements   X  X    X  X            X  x Smooth Pursuits:  Vertical Eye Movements - 50 Hz x 1m  Horizontal Eye Movements - 50 Hz x 1m  VOR  Vertical head - 50 Hz x 1m  Horizontal Head - 50 Hz x 1m    Attempted more saccadic:  Vertical Eye - 80 Hz x 1m  Horizontal Eye - 80 Hz x 1m  VOR - Attempted 80 Hz and unable  Vertical - 60 Hz x 1m  Horizontal - 60 Hz x 1m   Sit to stand  x 2x10   Standing - non tandem Eye/Head Movements    Smooth Pursuits:  Vertical Eye Movements - 50 Hz x 1m  Horizontal Eye Movements - 50 Hz x 1m  VOR  Vertical head - 50 Hz x 1m  Horizontal Head - 50 Hz x 1m    Attempted more saccadic:  Vertical Eye - 80 Hz x 1m  Horizontal Eye - 80 Hz x 1m  VOR - Attempted 80 Hz and unable  Vertical - 60 Hz x 1m  Horizontal - 60 Hz x 1m   Tandem stance X     Hand Touches - 2x10  Smooth Pursuit:  Vertical - 50 Hz x 1m  Horizontal - 50 Hz x 1m     Moira Hallpike  Negative Bilaterally today    Patient Education  Discussed "crystals" in inner ear, what they are, how they can go into different canals and cause dizziness, etc. Showed patient picture of inner ear          Plan for Next Visit:       MANUAL THERAPY TECHNIQUES were applied for (30) minutes, including:    Manual Intervention Performed Today    Soft Tissue Mobilization x Cervical paraspinal and UT   Joint Mobilizations x Light midcervical, upper cervicalgrads 1-4 rotational and shear mobs   Mobilization with movement          Functional Dry Needling        Plan for Next Visit:         Patient Education and Home Exercises       Home Exercises Provided and Patient Education Provided     Education provided: (included in treatment section) minutes    Home exercises    Written Home Exercises Provided: yes.  Exercises were reviewed and Juany was able to demonstrate them prior to the end of the session.  Juany demonstrated good  understanding of the education provided. See EMR under Patient Instructions for exercises provided during " therapy sessions.    Assessment     Patient with significant stiffness and guarding at the cervical spine.  This appears due to apprehension of dizziness but also due to mild pain and stiffness.  Lack of motion at the cervical spine can prevent some kinesioceptive and proprioceptive feedback.  Worked on gentle mobilization to decrease apprehension. No nystagmus noted with cervical spine movement.  There was some nystagmus with eye horizontal movement toward the right.  Will continue to progress into cervical movement along with eye activities to tolerance.      Juany is progressing well towards her goals.   Patient prognosis is Good.     Patient will continue to benefit from skilled outpatient physical therapy to address the deficits listed in the problem list box on initial evaluation, provide pt/family education and to maximize patient's level of independence in the home and community environment.     Patient's spiritual, cultural and educational needs considered and pt agreeable to plan of care and goals.     Anticipated Barriers for therapy: co-morbidities, chronicity of condition, and lack of understanding of condition     GOALS:     Short Term Goals:  6 weeks Progress   Dizziness: Patient will report decreased dizziness, and recent s/s trend is improving in order to progress towards LTG's. PC   Function: Patient will demonstrate improved function as indicated by a score of greater than or equal to 52 out of 100 on FOTO. PC   Gait: Patient will demonstrate improved gait mechanics in order to improve functional mobility, improve quality of life, and decrease risk of further injury or fall.  PC   HEP: Patient will demonstrate independence with HEP in order to progress toward functional independence. PC   Improve postural awareness.  PC         Long Term Goals:  12 weeks Progress   Dizziness:  Patient will return to normal ADL, recreational, and work related activities with less dizziness and limitation.  PC    Function: Patient will demonstrate improved function as indicated by a score of greater than or equal to 55 out of 100 on FOTO. PC   Gait: Patient will demonstrate normalized gait mechanics with minimal compensation in order to return to PLOF. PC   Decrease saccades as well as positional and VOR related s/s in order for patient to be able to switch positions or look quickly to different directions without onset of dizziness.  PC         Plan     Continue Plan of Care (POC) and progress per patient tolerance. See treatment section for details on planned progressions next session.      Jose Gould, PT

## 2024-02-02 ENCOUNTER — CLINICAL SUPPORT (OUTPATIENT)
Dept: AUDIOLOGY | Facility: CLINIC | Age: 79
End: 2024-02-02
Payer: MEDICARE

## 2024-02-02 DIAGNOSIS — H81.92 PERIPHERAL VESTIBULOPATHY OF LEFT EAR: Primary | ICD-10-CM

## 2024-02-02 DIAGNOSIS — H90.3 SENSORINEURAL HEARING LOSS, BILATERAL: ICD-10-CM

## 2024-02-02 PROCEDURE — 92557 COMPREHENSIVE HEARING TEST: CPT | Mod: S$GLB,,,

## 2024-02-02 PROCEDURE — 92537 CALORIC VSTBLR TEST W/REC: CPT | Mod: S$GLB,,,
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
## 2024-02-02 NOTE — Clinical Note
For your review. Big asymmetry on hearing test and calorics. She was going to PT but her visits are almost up. Can you place another referral for her?

## 2024-02-07 ENCOUNTER — OFFICE VISIT (OUTPATIENT)
Dept: NEUROLOGY | Facility: CLINIC | Age: 79
End: 2024-02-07
Payer: MEDICARE

## 2024-02-07 VITALS
BODY MASS INDEX: 26.07 KG/M2 | WEIGHT: 138 LBS | HEART RATE: 88 BPM | SYSTOLIC BLOOD PRESSURE: 144 MMHG | DIASTOLIC BLOOD PRESSURE: 77 MMHG

## 2024-02-07 DIAGNOSIS — R42 VERTIGO: Primary | ICD-10-CM

## 2024-02-07 DIAGNOSIS — Z87.820 PERSONAL HISTORY OF TRAUMATIC BRAIN INJURY: ICD-10-CM

## 2024-02-07 DIAGNOSIS — H91.92 HEARING LOSS ASSOCIATED WITH SYNDROME OF LEFT EAR: ICD-10-CM

## 2024-02-07 DIAGNOSIS — R42 DIZZINESS AND GIDDINESS: ICD-10-CM

## 2024-02-07 DIAGNOSIS — E11.22 CONTROLLED TYPE 2 DIABETES MELLITUS WITH STAGE 3 CHRONIC KIDNEY DISEASE, WITH LONG-TERM CURRENT USE OF INSULIN: ICD-10-CM

## 2024-02-07 DIAGNOSIS — E11.59 HYPERTENSION COMPLICATING DIABETES: ICD-10-CM

## 2024-02-07 DIAGNOSIS — Z79.4 CONTROLLED TYPE 2 DIABETES MELLITUS WITH STAGE 3 CHRONIC KIDNEY DISEASE, WITH LONG-TERM CURRENT USE OF INSULIN: ICD-10-CM

## 2024-02-07 DIAGNOSIS — I15.2 HYPERTENSION COMPLICATING DIABETES: ICD-10-CM

## 2024-02-07 DIAGNOSIS — R25.2 MUSCLE CRAMPS: ICD-10-CM

## 2024-02-07 DIAGNOSIS — E11.69 HYPERLIPIDEMIA ASSOCIATED WITH TYPE 2 DIABETES MELLITUS: ICD-10-CM

## 2024-02-07 DIAGNOSIS — R42 VERTIGO: ICD-10-CM

## 2024-02-07 DIAGNOSIS — H81.92 VESTIBULOPATHY OF LEFT EAR: Primary | ICD-10-CM

## 2024-02-07 DIAGNOSIS — E55.9 VITAMIN D DEFICIENCY: ICD-10-CM

## 2024-02-07 DIAGNOSIS — E11.22 CKD STAGE 3 DUE TO TYPE 2 DIABETES MELLITUS: ICD-10-CM

## 2024-02-07 DIAGNOSIS — N25.81 SECONDARY HYPERPARATHYROIDISM OF RENAL ORIGIN: ICD-10-CM

## 2024-02-07 DIAGNOSIS — M85.852 OSTEOPENIA OF NECKS OF BOTH FEMURS: ICD-10-CM

## 2024-02-07 DIAGNOSIS — G47.09 OTHER INSOMNIA: ICD-10-CM

## 2024-02-07 DIAGNOSIS — R11.0 NAUSEA: ICD-10-CM

## 2024-02-07 DIAGNOSIS — H91.92 HEARING LOSS OF LEFT EAR, UNSPECIFIED HEARING LOSS TYPE: ICD-10-CM

## 2024-02-07 DIAGNOSIS — R80.9 MICROALBUMINURIA DUE TO TYPE 2 DIABETES MELLITUS: ICD-10-CM

## 2024-02-07 DIAGNOSIS — E11.29 MICROALBUMINURIA DUE TO TYPE 2 DIABETES MELLITUS: ICD-10-CM

## 2024-02-07 DIAGNOSIS — G89.29 CHRONIC LEFT-SIDED LOW BACK PAIN WITH LEFT-SIDED SCIATICA: ICD-10-CM

## 2024-02-07 DIAGNOSIS — N18.30 CONTROLLED TYPE 2 DIABETES MELLITUS WITH STAGE 3 CHRONIC KIDNEY DISEASE, WITH LONG-TERM CURRENT USE OF INSULIN: ICD-10-CM

## 2024-02-07 DIAGNOSIS — M85.851 OSTEOPENIA OF NECKS OF BOTH FEMURS: ICD-10-CM

## 2024-02-07 DIAGNOSIS — E78.5 HYPERLIPIDEMIA ASSOCIATED WITH TYPE 2 DIABETES MELLITUS: ICD-10-CM

## 2024-02-07 DIAGNOSIS — E61.1 IRON DEFICIENCY: ICD-10-CM

## 2024-02-07 DIAGNOSIS — M54.42 CHRONIC LEFT-SIDED LOW BACK PAIN WITH LEFT-SIDED SCIATICA: ICD-10-CM

## 2024-02-07 DIAGNOSIS — R11.15 CYCLICAL VOMITING: ICD-10-CM

## 2024-02-07 DIAGNOSIS — N18.30 CKD STAGE 3 DUE TO TYPE 2 DIABETES MELLITUS: ICD-10-CM

## 2024-02-07 DIAGNOSIS — E78.1 HYPERTRIGLYCERIDEMIA: ICD-10-CM

## 2024-02-07 PROBLEM — H81.90 VESTIBULOPATHY: Status: ACTIVE | Noted: 2024-02-07

## 2024-02-07 PROCEDURE — 99417 PROLNG OP E/M EACH 15 MIN: CPT | Mod: S$GLB,,, | Performed by: PSYCHIATRY & NEUROLOGY

## 2024-02-07 PROCEDURE — 1101F PT FALLS ASSESS-DOCD LE1/YR: CPT | Mod: CPTII,S$GLB,, | Performed by: PSYCHIATRY & NEUROLOGY

## 2024-02-07 PROCEDURE — 1126F AMNT PAIN NOTED NONE PRSNT: CPT | Mod: CPTII,S$GLB,, | Performed by: PSYCHIATRY & NEUROLOGY

## 2024-02-07 PROCEDURE — 3288F FALL RISK ASSESSMENT DOCD: CPT | Mod: CPTII,S$GLB,, | Performed by: PSYCHIATRY & NEUROLOGY

## 2024-02-07 PROCEDURE — 99999 PR PBB SHADOW E&M-EST. PATIENT-LVL IV: CPT | Mod: PBBFAC,,, | Performed by: PSYCHIATRY & NEUROLOGY

## 2024-02-07 PROCEDURE — 99215 OFFICE O/P EST HI 40 MIN: CPT | Mod: S$GLB,,, | Performed by: PSYCHIATRY & NEUROLOGY

## 2024-02-07 PROCEDURE — 3078F DIAST BP <80 MM HG: CPT | Mod: CPTII,S$GLB,, | Performed by: PSYCHIATRY & NEUROLOGY

## 2024-02-07 PROCEDURE — 3077F SYST BP >= 140 MM HG: CPT | Mod: CPTII,S$GLB,, | Performed by: PSYCHIATRY & NEUROLOGY

## 2024-02-07 PROCEDURE — 1159F MED LIST DOCD IN RCRD: CPT | Mod: CPTII,S$GLB,, | Performed by: PSYCHIATRY & NEUROLOGY

## 2024-02-07 RX ORDER — ONDANSETRON 8 MG/1
8 TABLET, ORALLY DISINTEGRATING ORAL EVERY 6 HOURS PRN
Qty: 24 TABLET | Refills: 3 | Status: SHIPPED | OUTPATIENT
Start: 2024-02-07 | End: 2024-02-16 | Stop reason: SDUPTHER

## 2024-02-07 RX ORDER — AMITRIPTYLINE HYDROCHLORIDE 50 MG/1
50 TABLET, FILM COATED ORAL NIGHTLY
Qty: 90 TABLET | Refills: 3 | Status: SHIPPED | OUTPATIENT
Start: 2024-02-07 | End: 2024-02-22

## 2024-02-07 RX ORDER — MECLIZINE HYDROCHLORIDE 25 MG/1
25 TABLET ORAL 3 TIMES DAILY
Qty: 270 TABLET | Refills: 3 | Status: SHIPPED | OUTPATIENT
Start: 2024-02-07
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
## 2024-02-13 ENCOUNTER — TELEPHONE (OUTPATIENT)
Dept: NEUROLOGY | Facility: CLINIC | Age: 79
End: 2024-02-13
Payer: MEDICARE

## 2024-02-13 NOTE — TELEPHONE ENCOUNTER
----- Message from Dnao Guzman sent at 2/13/2024  9:08 AM CST -----  Contact: self  Pt is asking for an return call in reference to pharmacy needing an authorization for an prescription please call back at .[phone] Thx CJ

## 2024-02-16 DIAGNOSIS — R42 VERTIGO: ICD-10-CM

## 2024-02-16 DIAGNOSIS — R11.0 NAUSEA: ICD-10-CM

## 2024-02-16 RX ORDER — ONDANSETRON 8 MG/1
8 TABLET, ORALLY DISINTEGRATING ORAL
Qty: 12 TABLET | Refills: 3 | Status: SHIPPED | OUTPATIENT
Start: 2024-02-16

## 2024-02-22 ENCOUNTER — PATIENT MESSAGE (OUTPATIENT)
Dept: NEUROLOGY | Facility: CLINIC | Age: 79
End: 2024-02-22
Payer: MEDICARE

## 2024-02-22 DIAGNOSIS — H81.90 VESTIBULOPATHY, UNSPECIFIED LATERALITY: Primary | ICD-10-CM

## 2024-02-22 RX ORDER — NORTRIPTYLINE HYDROCHLORIDE 25 MG/1
25 CAPSULE ORAL NIGHTLY
Qty: 90 CAPSULE | Refills: 3 | Status: SHIPPED | OUTPATIENT
Start: 2024-02-22 | End: 2025-02-21

## 2024-03-28 ENCOUNTER — DOCUMENTATION ONLY (OUTPATIENT)
Dept: REHABILITATION | Facility: HOSPITAL | Age: 79
End: 2024-03-28
Payer: MEDICARE

## 2024-03-28 DIAGNOSIS — R42 VERTIGO: Primary | ICD-10-CM

## 2024-03-28 NOTE — PROGRESS NOTES
[facility]  Physical Therapy Discharge Note    Name: Juany Ramírez  Clinic Number: [id number]    Therapy Diagnosis:   Encounter Diagnosis   Name Primary?    Vertigo Yes     Physician: Sai Gamino MD     Physician Orders: PT Eval and Treat  Medical Diagnosis from Referral: Vertigo  Evaluation Date: 12/28/2023      Date of Last visit: 1/29/2024  Total Visits Received: 4    ASSESSMENT      Patient did not return for final assessment.     Discharge reason: Patient has not attended therapy since 1/29/2024    Discharge FOTO Score: N/A    GOALS:     Short Term Goals:  6 weeks Progress   Dizziness: Patient will report decreased dizziness, and recent s/s trend is improving in order to progress towards LTG's. PC   Function: Patient will demonstrate improved function as indicated by a score of greater than or equal to 52 out of 100 on FOTO. PC   Gait: Patient will demonstrate improved gait mechanics in order to improve functional mobility, improve quality of life, and decrease risk of further injury or fall.  PC   HEP: Patient will demonstrate independence with HEP in order to progress toward functional independence. PC   Improve postural awareness.  PC         Long Term Goals:  12 weeks Progress   Dizziness:  Patient will return to normal ADL, recreational, and work related activities with less dizziness and limitation.  PC   Function: Patient will demonstrate improved function as indicated by a score of greater than or equal to 55 out of 100 on FOTO. PC   Gait: Patient will demonstrate normalized gait mechanics with minimal compensation in order to return to PLOF. PC   Decrease saccades as well as positional and VOR related s/s in order for patient to be able to switch positions or look quickly to different directions without onset of dizziness.  PC           PLAN   This patient is discharged from Physical Therapy      Elizabeth Centeno, PT

## 2024-11-20 DIAGNOSIS — R11.0 NAUSEA: ICD-10-CM

## 2024-11-20 DIAGNOSIS — R42 VERTIGO: ICD-10-CM

## 2024-11-20 RX ORDER — ONDANSETRON 8 MG/1
TABLET, ORALLY DISINTEGRATING ORAL
Qty: 12 TABLET | Refills: 3 | Status: SHIPPED | OUTPATIENT
Start: 2024-11-20
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
